# Patient Record
Sex: FEMALE | Race: BLACK OR AFRICAN AMERICAN | NOT HISPANIC OR LATINO | Employment: OTHER | URBAN - METROPOLITAN AREA
[De-identification: names, ages, dates, MRNs, and addresses within clinical notes are randomized per-mention and may not be internally consistent; named-entity substitution may affect disease eponyms.]

---

## 2023-10-02 DIAGNOSIS — I15.9 SECONDARY HYPERTENSION: Primary | ICD-10-CM

## 2023-10-02 RX ORDER — LISINOPRIL 5 MG/1
5 TABLET ORAL DAILY
Qty: 30 TABLET | Refills: 0 | Status: SHIPPED | OUTPATIENT
Start: 2023-10-02 | End: 2023-11-14

## 2023-10-30 DIAGNOSIS — I15.9 SECONDARY HYPERTENSION: ICD-10-CM

## 2023-11-14 RX ORDER — LISINOPRIL 5 MG/1
5 TABLET ORAL DAILY
Qty: 30 TABLET | Refills: 10 | Status: SHIPPED | OUTPATIENT
Start: 2023-11-14

## 2023-12-07 ENCOUNTER — TELEPHONE (OUTPATIENT)
Dept: PULMONOLOGY | Facility: HOSPITAL | Age: 73
End: 2023-12-07

## 2023-12-07 DIAGNOSIS — J84.9 INTERSTITIAL LUNG DISEASE (MULTI): Primary | ICD-10-CM

## 2023-12-08 RX ORDER — PREDNISONE 10 MG/1
10 TABLET ORAL DAILY
Qty: 30 TABLET | Refills: 0 | Status: SHIPPED | OUTPATIENT
Start: 2023-12-08 | End: 2024-01-05 | Stop reason: SDUPTHER

## 2023-12-12 ENCOUNTER — TELEPHONE (OUTPATIENT)
Dept: PULMONOLOGY | Facility: HOSPITAL | Age: 73
End: 2023-12-12
Payer: MEDICARE

## 2023-12-12 NOTE — TELEPHONE ENCOUNTER
Pt called regarding her Ofev. She stated that her allison ran out and [facility] pharmacy got her in contact with [facility]. [facility] sent her the application via email. Pt stated that she is going to try and fill out the application on her phone. Other methods of filling out the application was discussed. Pt verbalized understanding. A paper copy of the [employer] application has been sent to the pt's confirmed address.

## 2023-12-15 ENCOUNTER — APPOINTMENT (OUTPATIENT)
Dept: PULMONOLOGY | Facility: HOSPITAL | Age: 73
End: 2023-12-15
Payer: MEDICARE

## 2024-01-03 ENCOUNTER — TELEPHONE (OUTPATIENT)
Dept: PULMONOLOGY | Facility: HOSPITAL | Age: 74
End: 2024-01-03
Payer: MEDICARE

## 2024-01-03 NOTE — TELEPHONE ENCOUNTER
Pt assistance application for Ofev was faxed to [facility] at [phone]. Fax confirmation was received.

## 2024-01-04 ENCOUNTER — TELEPHONE (OUTPATIENT)
Dept: PULMONOLOGY | Facility: HOSPITAL | Age: 74
End: 2024-01-04
Payer: MEDICARE

## 2024-01-04 NOTE — TELEPHONE ENCOUNTER
Fax received from [facility] requesting pt's current medication list. Pt's medication list was faxed to [facility] at [phone]. Fax confirmation was received.

## 2024-01-05 ENCOUNTER — TELEMEDICINE (OUTPATIENT)
Dept: PULMONOLOGY | Facility: HOSPITAL | Age: 74
End: 2024-01-05
Payer: MEDICARE

## 2024-01-05 DIAGNOSIS — J96.11 CHRONIC RESPIRATORY FAILURE WITH HYPOXIA (MULTI): ICD-10-CM

## 2024-01-05 DIAGNOSIS — Z79.52 ON PREDNISONE THERAPY: Primary | ICD-10-CM

## 2024-01-05 DIAGNOSIS — J84.9 INTERSTITIAL LUNG DISEASE (MULTI): ICD-10-CM

## 2024-01-05 PROCEDURE — 99214 OFFICE O/P EST MOD 30 MIN: CPT | Mod: 95 | Performed by: INTERNAL MEDICINE

## 2024-01-05 PROCEDURE — 99214 OFFICE O/P EST MOD 30 MIN: CPT | Performed by: INTERNAL MEDICINE

## 2024-01-05 RX ORDER — PREDNISONE 10 MG/1
10 TABLET ORAL DAILY
Qty: 30 TABLET | Refills: 10 | Status: SHIPPED | OUTPATIENT
Start: 2024-01-05

## 2024-01-05 NOTE — PROGRESS NOTES
History of Present Illness  Mrs Denson is a y.o woman, remote minimal smoking hx (10 packy year), being evaluated for ILD.     Previous pulmonary history:  She is not known to have previous lung hx. She presented to [facility] on 10/20th with a 3 week history of progressive dyspnea. Her CT scan (10/20/2018) was negative for PE but showed bilateral lower lobe consolidation concerning for ILD (OP vs NSIP). She was treated with abx and a bronch was done which was negative for infection, also no eosinophils. Her serologies were positive for SSA and ESR was 120.   She was d/dorita home on 10/24/2018 on 40mg of prednisone daily. and on 3L NC of supplemental oxygen     11/09/2018: Since her d/c, her breathing has been improving. She is still short of breath on exertion with a dry cough. No f/c/ns. No cp. No SE of the prednisone     01/18/2019: Since her last visit, she feels her breathing is improving. She is more physically active and tolerating more exercise. No cough, sputum f/c/ns. She is tolerating prednisone well but has gained some weight. Had repeat CT, PFTs and 6 MWT (results below).      04/22/2019: Since the last visit, patient's breathing is mostly unchanged. She did not go down on the prednisone like we discussed prior because she forgot. Missed her appointment with rheumatology. Continues to be short of breath on exertion and uses supplemental O2 only with exertion     11/22/2019: Since the last visit, patient's breathing is mostly unchanged. No new chest pain, cough, sputum, fever, chills or night sweats.      4/2020: patient scheduled but canceled, not seen     10/2/2020: Since last visit 11/2019, patient having a cough, gotten better with aromatherapy (eucalyptus), herbal teas (radha tea, lung health, heart health, sleepytime, chamomile), cough drops, robitussin DM at night. dries up mucus. Has thick yellow mucus in the morning after the pm Robitussin DM which clears after using Duoneb or albuterol inhaler. SOB  when carrying groceries, lifting heavy stuff, chasing great granddaughter. 7 steps total into apartment. Needs to stop after the 7 steps, gasping for air and has to switch to continuous O2 and increase to 4L (uses conservation device when she is out). Doesn't sleep with O2. Patient hypoxic on 6MWT today on her 3L. Taking prednisone 10mg daily, did not start antifibrotic therapy and has been lost to follow up since 11/2019.     11/20/2020: Since the last visit, patient's breathing is mostly unchanged. No new chest pain, cough, sputum, fever, chills or night sweats. Still has not received her antifibrotic. Pending starting pulmoanry rehab.     01/08/2020: Since her last visit, she finally got approved for her Ofev and has been taking it for about 2 weeks. No N/V/D or other noticeable side effects. She feels her breathing is improved. No new chest pain, cough, sputum, fever, chills or night sweats. Had multiple questions about transplant.      05/21/2021: Since the last visit, patient's breathing has been improving. has been going to pulmonary rehab and feels that her breathing and exercise capacity are greatly reduced. Going through lung transplant evaluation. Still pending dental and Gyn appointment. had repeat mai/dlco and 6MWT done (results below).      12/03/2021: Since the last visit, patient's breathing has been worsening. No new chest pain, cough, sputum, fever, chills or night sweats. was not a candidate for transplant at  due to his PRAs. Was referred to [facility] for second opinion but her evaluation is still pending. Stopped going to pulmonary rehab. Patient had repeat mai 6MWT done (results below).      04/07/2023: Since the last visit, patient's breathing has been improving. she is on wait list for LTx at [facility]. She is compliant with her therapy. SHe thinks her LAS score is in the 75% percentile. No new chest pain, cough, sputum, fever, chills or night sweats. Had no hospitalization. No recent data in .  Transitioning her rehab to [facility] for insurance purposes.    1/5/2024: Since the last visit, patient's breathing has been slowly worsening. She is up to 7L of NC with ambulation and 6L with rest. She is still on the Ltx list and follows up with the [facility] team. Her last 6MWT was 830 feet. She ran out of allison money for her Ofev but is now back approved and will get her medication on Monday. Continues to take her prednisone 10mg daily.      Comorbidities:  DM on insulin     SH:  smoking: remote minimal smoking hx (10 packy year)  drinking: none  illicit drug use: history of marijuana     Occupation:  worked as .   No known exposure to asbestos, silica and beryllium     Family History:  No family history of lung diseases or cancer     Imaging history:  11/28/2019 -> HRCT with bibasilar fibrotic changes and central bronchiectasis. no peripheral bronchiectasis or honeycombing. Most likely fibrotic NSIP with possible OP  10/20/2018 -> CTPE negative for PE, but with patchy opacities at the bases with subpleural reticulation     PFTs:  12/03/2021-> Ratio of 0.85/FEV1 1.02L (59%)(no BD response)/FVC 1.2L (85%)  05/21/2021 -> Ratio of 0.88/FEV1 1.06L (61%)/FVC 1.2L (54%)/DLCO unmeasurable  11/22/2019 -> Ratio of 0.72/FEV1 0.92L (52%) (no BD response)/FVC 1.28L (57%)/TLC 1.97L (51%)/RVtoTLC ratio 0.34 /DLCO 25%  01/18/2019 -> Ratio of 0.71/FEV1 1.18L (66%)/FVC 1.66L (73%)/ TLC 74%/RVtoTLC ratio 0.45/DLCO 36 ->62%  11/09/2018 <- FEV1/FVC ratio 0.78/FEV1 1.1L (62%)/FVC 1.40L (62%)  10/24/2018 <- FEV1/FVC ratio 0.51/FEV1 0.66L (37%)/FVC 1.29L (57%)     6 MWTs:   12/03/2021->on 25L, 191m. Peak SpO2 of 99%. Rhys SpO2 87%.   05/21/2021: ->on 10L, 212m. Peak SpO2 of 100%. Rhys SpO2 74%.   10/2/2020: 212m, 88% at rest on 3L, 80% on exertion on 3L. Tahir 3. CORONA 2.   11/22/2019 -> on 3L NC, 203 m with a desat from 97% to 87%  01/18/2019 -> 239m on 3L NC without desaturation, Rhys SpO2 of 96%     Lung biopsy 2/2020:  Findings consistent with UIP     Echo:  10/18/2018 -> normal EF, diastolic dysfunction, no LA, RA and RV     Labs:  no anemia, eosinophilia     Review of Systems:   Pertinent positives and review of systems as noted above.  Remaining 10 review of systems is negative or noncontributory to today's episode of care.     There were no vitals filed for this visit.     PHYSICAL EXAMINATION  Constitutional: Alert and oriented. In no acute distress. Well developed, well nourished.  Head and Face: Normal.   Oropharynx: normal.  Neck: No neck mass observed.  Pulmonary: Chest is normal to inspection. No increased work of breathing or signs of respiratory distress.   CV:  No obvious peripheral edema.  MSK: normal gait and station. No clubbing or cyanosis of the fingernails.  Skin: Normal skin color and pigmentation, normal skin turgor, and no rash.  Neurologic:  EOMI. Moving all 4 extremities.  Psychiatric: Intact judgement and insight.     Medication Documentation Review Audit    **Prior to Admission medications have not yet been reviewed**        Provider Impressions     Ms. REFUGIO Bui is a 73 y.o.  female with prior smoking history who presented initially in 2018 with SOB and CT findings consistent initally with NSIP vs inderterminate UIP which was responsive to steroids. Serology had high ESR and positive SSA, concern for CTD-ILD. She was continued on prednisone but has been lost to follow up multiple times with progression of disease. She eventually underwent wedge biopsy in 2/2020 which showed findings consistent with UIP. She was recommended to start antifibrotics, but it is unclear what happened. She was again lost to follow up until 10/2020, now with worsening hypoxia. She has been referred to rheumatology to evaluate underlying CTD multiple times but has not made it to an appointment. She is currently listed for Ltx at [facility] since 04/2023. High cPRAs. No offers yet.     1.Lung fibrosis:   -UIP on biopsy, positive serologies  but no clear CTD. Although IPAF, will treat as IPF. Started Ofev in December 2020.   -Repeat HRCT with worsening fibrotic changes. extensive  -Continue prednisone 10mg daily  -Re-refer to rheumatology. Pending.  -Completed her transplant evaluation. Not a candidate at  2/2 high PRAs. Referred to [facility] for 2nd opinion.   She is on wait list for LTx at [facility]. She is compliant with her therapy. No new chest pain, cough, sputum, fever, chills or night sweats. Had on hospitalization. No recent data in . Transitioned her rehab to [facility] for insurance purposes.     2. chronic hypoxic respiratory failure:  -Due to ILD, needs 7-8L with ambulation     Follow up in 6 months

## 2024-01-05 NOTE — PATIENT INSTRUCTIONS
Larisa Denson it was pleasure seeing you in clinic today. We discussed the following:    You will continue to follow up with your transplant team at [facility]  You will continue your ofev and prednisone. I just refilled your prednisone      We will see you back in clinic in 6 months      For scheduling purposes:    Call [phone] to schedule a breathing or a walking test     Call [phone] to schedule  EKG's, Echocardiograms and Cardiopulmonary Stress Tests.    Call [phone] to schedule Radiology tests such as Nuclear Medicine Stress Tests, CT Scans, and MRI's.    Should you have any questions Please Call our pulmonary nurse Paige Landrum at [phone]  or my assistant Oliva Orozco at [phone]

## 2024-03-12 DIAGNOSIS — J45.40 MODERATE PERSISTENT ASTHMA, UNSPECIFIED WHETHER COMPLICATED (HHS-HCC): Primary | ICD-10-CM

## 2024-03-19 RX ORDER — IPRATROPIUM BROMIDE AND ALBUTEROL SULFATE 2.5; .5 MG/3ML; MG/3ML
SOLUTION RESPIRATORY (INHALATION)
Qty: 30 ML | Refills: 11 | Status: SHIPPED | OUTPATIENT
Start: 2024-03-19

## 2024-04-11 ENCOUNTER — TELEPHONE (OUTPATIENT)
Dept: PULMONOLOGY | Facility: HOSPITAL | Age: 74
End: 2024-04-11
Payer: MEDICARE

## 2024-04-11 DIAGNOSIS — J84.9 ILD (INTERSTITIAL LUNG DISEASE) (MULTI): ICD-10-CM

## 2024-04-11 NOTE — TELEPHONE ENCOUNTER
Returned call to [facility] pharmacy regarding refill on pt's Ofev. Verbal was given to supply pt with refills. Pt also called stating that she needs a new disability placard. Order placed. Tried to call pt at [phone] regarding an update on these matters, but was unsuccessful. Voicemail left with instructions to return the call at [phone].

## 2024-10-25 ENCOUNTER — TELEPHONE (OUTPATIENT)
Dept: CARDIAC REHAB | Facility: HOSPITAL | Age: 74
End: 2024-10-25
Payer: MEDICARE

## 2024-10-25 ENCOUNTER — TRANSCRIBE ORDERS (OUTPATIENT)
Dept: CARDIAC REHAB | Facility: HOSPITAL | Age: 74
End: 2024-10-25
Payer: MEDICARE

## 2024-10-25 DIAGNOSIS — Z94.2 LUNG REPLACED BY TRANSPLANT (MULTI): Primary | ICD-10-CM

## 2024-10-25 RX ORDER — METOPROLOL TARTRATE 25 MG/1
25 TABLET, FILM COATED ORAL 2 TIMES DAILY
COMMUNITY
Start: 2024-08-01

## 2024-10-25 RX ORDER — TRAZODONE HYDROCHLORIDE 50 MG/1
1 TABLET ORAL NIGHTLY
COMMUNITY
Start: 2024-08-13

## 2024-10-25 RX ORDER — ISOPROPYL ALCOHOL 70 ML/100ML
SWAB TOPICAL
COMMUNITY
Start: 2024-10-07

## 2024-10-25 RX ORDER — INSULIN GLARGINE 100 [IU]/ML
10 INJECTION, SOLUTION SUBCUTANEOUS NIGHTLY
COMMUNITY
Start: 2024-08-15

## 2024-10-25 RX ORDER — ACYCLOVIR 400 MG/1
400 TABLET ORAL 2 TIMES DAILY
COMMUNITY
Start: 2024-08-28

## 2024-10-25 RX ORDER — ACETAMINOPHEN 325 MG/1
650 TABLET ORAL EVERY 6 HOURS PRN
COMMUNITY
Start: 2024-08-01

## 2024-10-25 RX ORDER — POSACONAZOLE 100 MG/1
200 TABLET, DELAYED RELEASE ORAL
COMMUNITY
Start: 2024-08-15

## 2024-10-25 RX ORDER — NAPROXEN SODIUM 220 MG/1
81 TABLET, FILM COATED ORAL
COMMUNITY
Start: 2024-08-01

## 2024-10-25 RX ORDER — ATORVASTATIN CALCIUM 10 MG/1
1 TABLET, FILM COATED ORAL NIGHTLY
COMMUNITY
Start: 2024-08-01

## 2024-10-25 RX ORDER — PANTOPRAZOLE SODIUM 40 MG/1
1 TABLET, DELAYED RELEASE ORAL DAILY
COMMUNITY
Start: 2024-08-01

## 2024-10-25 RX ORDER — ALBUTEROL SULFATE 0.83 MG/ML
2.5 SOLUTION RESPIRATORY (INHALATION) EVERY 4 HOURS PRN
COMMUNITY
Start: 2024-08-01

## 2024-10-25 RX ORDER — CHOLECALCIFEROL (VITAMIN D3) 25 MCG
1 TABLET ORAL DAILY
COMMUNITY
Start: 2019-01-04

## 2024-10-25 RX ORDER — NEEDLES, DISPOSABLE 25GX5/8"
NEEDLE, DISPOSABLE MISCELLANEOUS
COMMUNITY
Start: 2024-09-16

## 2024-10-25 RX ORDER — LANOLIN ALCOHOL/MO/W.PET/CERES
800 CREAM (GRAM) TOPICAL 3 TIMES DAILY
COMMUNITY
Start: 2024-08-01

## 2024-10-25 RX ORDER — MYCOPHENOLATE MOFETIL 250 MG/1
500 CAPSULE ORAL 2 TIMES DAILY
COMMUNITY
Start: 2024-08-29 | End: 2025-08-29

## 2024-10-25 RX ORDER — ONDANSETRON 4 MG/1
4 TABLET, FILM COATED ORAL EVERY 8 HOURS PRN
COMMUNITY
Start: 2024-09-06 | End: 2024-12-05

## 2024-10-25 RX ORDER — INSULIN ASPART 100 [IU]/ML
INJECTION, SOLUTION INTRAVENOUS; SUBCUTANEOUS
COMMUNITY
Start: 2019-07-03

## 2024-10-25 NOTE — TELEPHONE ENCOUNTER
PATIENT: Larisa FARAH Denson  DATE: 10/25/2024    Patient called regarding scheduling Pulmonary Rehab at [facility].   Patient previously attended Pulm Rehab at [facility] and has been rereferred s/p bilateral lung transplant. She called to notify staff that she would like to attend  site and that referral would be coming via fax. Referral order was received and transcribed into chart. Class times and days were discussed with pt. Patient plans to reach out at a later time to schedule evaluation after she talks to family about available transportation options.     Aisha Medina RN

## 2024-11-12 NOTE — TELEPHONE ENCOUNTER
PATIENT: Larisa FARAH Denson  DATE: 11/12/2024    Patient called regarding scheduling Pulmonary Rehab at [facility].   Spoke to patient and scheduled initial evaluation for 11/21/24 @ 1:00 PM. Patient to confirm transportation and will call rehab staff to reschedule if she cannot find a ride to appt.    Aisha Medina RN

## 2024-11-21 ENCOUNTER — APPOINTMENT (OUTPATIENT)
Dept: CARDIAC REHAB | Facility: HOSPITAL | Age: 74
End: 2024-11-21
Payer: MEDICARE